# Patient Record
Sex: MALE | Race: WHITE | Employment: OTHER | URBAN - METROPOLITAN AREA
[De-identification: names, ages, dates, MRNs, and addresses within clinical notes are randomized per-mention and may not be internally consistent; named-entity substitution may affect disease eponyms.]

---

## 2021-12-13 ENCOUNTER — TRANSCRIBE ORDER (OUTPATIENT)
Dept: SCHEDULING | Age: 66
End: 2021-12-13

## 2021-12-13 DIAGNOSIS — R59.0 LOCALIZED ENLARGED LYMPH NODES: ICD-10-CM

## 2021-12-13 DIAGNOSIS — R94.4 ABNORMAL RESULTS OF KIDNEY FUNCTION STUDIES: ICD-10-CM

## 2021-12-13 DIAGNOSIS — R19.07 GENERALIZED INTRA-ABDOMINAL AND PELVIC SWELLING, MASS AND LUMP: Primary | ICD-10-CM

## 2021-12-13 DIAGNOSIS — L04.9 ACUTE LYMPHADENITIS: ICD-10-CM

## 2021-12-15 ENCOUNTER — HOSPITAL ENCOUNTER (OUTPATIENT)
Dept: CT IMAGING | Age: 66
Discharge: HOME | End: 2021-12-15
Payer: MEDICARE

## 2021-12-15 DIAGNOSIS — R19.07 GENERALIZED INTRA-ABDOMINAL AND PELVIC SWELLING, MASS AND LUMP: ICD-10-CM

## 2021-12-15 DIAGNOSIS — L04.9 ACUTE LYMPHADENITIS: ICD-10-CM

## 2021-12-15 DIAGNOSIS — R59.0 LOCALIZED ENLARGED LYMPH NODES: ICD-10-CM

## 2021-12-15 DIAGNOSIS — R94.4 ABNORMAL RESULTS OF KIDNEY FUNCTION STUDIES: ICD-10-CM

## 2021-12-15 PROCEDURE — 74177 CT ABD & PELVIS W/CONTRAST: CPT | Performed by: GENERAL PRACTICE

## 2021-12-21 ENCOUNTER — TRANSCRIBE ORDER (OUTPATIENT)
Dept: SCHEDULING | Age: 66
End: 2021-12-21

## 2021-12-21 DIAGNOSIS — R93.2 NONVISUALIZATION OF GALLBLADDER: Primary | ICD-10-CM

## 2021-12-23 ENCOUNTER — HOSPITAL ENCOUNTER (OUTPATIENT)
Dept: CT IMAGING | Age: 66
Discharge: HOME | End: 2021-12-23
Payer: MEDICARE

## 2021-12-23 DIAGNOSIS — R93.2 NONVISUALIZATION OF GALLBLADDER: ICD-10-CM

## 2021-12-23 PROCEDURE — 74178 CT ABD&PLV WO CNTR FLWD CNTR: CPT | Performed by: GENERAL PRACTICE

## 2022-01-04 ENCOUNTER — TRANSCRIBE ORDER (OUTPATIENT)
Dept: SCHEDULING | Age: 67
End: 2022-01-04

## 2022-01-04 DIAGNOSIS — R93.2 ABNORMAL CT OF LIVER: ICD-10-CM

## 2022-01-04 DIAGNOSIS — R19.7 DIARRHEA: Primary | ICD-10-CM

## 2022-01-04 DIAGNOSIS — R79.89 ABNORMAL LFTS: ICD-10-CM

## 2022-01-07 ENCOUNTER — HOSPITAL ENCOUNTER (OUTPATIENT)
Dept: MRI IMAGING | Age: 67
Discharge: HOME | End: 2022-01-07
Attending: GENERAL PRACTICE
Payer: MEDICARE

## 2022-01-07 VITALS — WEIGHT: 180 LBS

## 2022-01-07 DIAGNOSIS — R19.7 DIARRHEA: ICD-10-CM

## 2022-01-07 DIAGNOSIS — R93.2 ABNORMAL CT OF LIVER: ICD-10-CM

## 2022-01-07 DIAGNOSIS — R79.89 ABNORMAL LFTS: ICD-10-CM

## 2022-01-07 PROCEDURE — A9575 INJ GADOTERATE MEGLUMI 0.1ML: HCPCS | Performed by: RADIOLOGY

## 2022-01-07 PROCEDURE — 74183 MRI ABD W/O CNTR FLWD CNTR: CPT

## 2022-01-07 PROCEDURE — 74011250636 HC RX REV CODE- 250/636: Performed by: RADIOLOGY

## 2022-01-07 RX ORDER — GADOTERATE MEGLUMINE 376.9 MG/ML
16 INJECTION INTRAVENOUS
Status: COMPLETED | OUTPATIENT
Start: 2022-01-07 | End: 2022-01-07

## 2022-01-07 RX ADMIN — GADOTERATE MEGLUMINE 16 ML: 376.9 INJECTION INTRAVENOUS at 18:18

## 2023-01-09 ENCOUNTER — OFFICE VISIT (OUTPATIENT)
Dept: NEUROLOGY | Age: 68
End: 2023-01-09
Payer: MEDICARE

## 2023-01-09 VITALS
HEIGHT: 68 IN | HEART RATE: 101 BPM | WEIGHT: 191 LBS | SYSTOLIC BLOOD PRESSURE: 136 MMHG | OXYGEN SATURATION: 99 % | RESPIRATION RATE: 17 BRPM | BODY MASS INDEX: 28.95 KG/M2 | DIASTOLIC BLOOD PRESSURE: 74 MMHG

## 2023-01-09 DIAGNOSIS — Z79.899 LONG-TERM CURRENT USE OF ANTICONVULSANT: ICD-10-CM

## 2023-01-09 DIAGNOSIS — G40.309 GEN IDIOPATHIC EPILEPSY, NOT INTRACTABLE, W/O STAT EPI (HCC): Primary | ICD-10-CM

## 2023-01-09 PROCEDURE — 1101F PT FALLS ASSESS-DOCD LE1/YR: CPT | Performed by: PSYCHIATRY & NEUROLOGY

## 2023-01-09 PROCEDURE — G8427 DOCREV CUR MEDS BY ELIG CLIN: HCPCS | Performed by: PSYCHIATRY & NEUROLOGY

## 2023-01-09 PROCEDURE — G8417 CALC BMI ABV UP PARAM F/U: HCPCS | Performed by: PSYCHIATRY & NEUROLOGY

## 2023-01-09 PROCEDURE — 1123F ACP DISCUSS/DSCN MKR DOCD: CPT | Performed by: PSYCHIATRY & NEUROLOGY

## 2023-01-09 PROCEDURE — G8510 SCR DEP NEG, NO PLAN REQD: HCPCS | Performed by: PSYCHIATRY & NEUROLOGY

## 2023-01-09 PROCEDURE — 3017F COLORECTAL CA SCREEN DOC REV: CPT | Performed by: PSYCHIATRY & NEUROLOGY

## 2023-01-09 PROCEDURE — 99204 OFFICE O/P NEW MOD 45 MIN: CPT | Performed by: PSYCHIATRY & NEUROLOGY

## 2023-01-09 PROCEDURE — G8536 NO DOC ELDER MAL SCRN: HCPCS | Performed by: PSYCHIATRY & NEUROLOGY

## 2023-01-09 RX ORDER — CEFUROXIME AXETIL 250 MG/1
TABLET ORAL
COMMUNITY
Start: 2022-11-23

## 2023-01-09 RX ORDER — ATORVASTATIN CALCIUM 40 MG/1
TABLET, FILM COATED ORAL
COMMUNITY
Start: 2022-11-15

## 2023-01-09 RX ORDER — DIVALPROEX SODIUM 250 MG/1
TABLET, DELAYED RELEASE ORAL
Qty: 270 TABLET | Refills: 3 | Status: SHIPPED | OUTPATIENT
Start: 2023-01-09

## 2023-01-09 RX ORDER — TADALAFIL 5 MG/1
TABLET ORAL
COMMUNITY

## 2023-01-09 RX ORDER — GLUCOSAMINE SULFATE 1500 MG
POWDER IN PACKET (EA) ORAL
COMMUNITY

## 2023-01-09 RX ORDER — SILDENAFIL 25 MG/1
25 TABLET, FILM COATED ORAL AS NEEDED
COMMUNITY

## 2023-01-09 RX ORDER — DIVALPROEX SODIUM 250 MG/1
250 TABLET, DELAYED RELEASE ORAL 3 TIMES DAILY
COMMUNITY

## 2023-01-09 NOTE — PROGRESS NOTES
Chief Complaint   Patient presents with    New Patient    Epilepsy       Referred by: DR Linda Figueroa is a 69-year-old gentleman with epilepsy here to establish care. I spoke to him personally. He tells me he was diagnosed with epilepsy at about age 23. His last seizure was 0. He is well controlled currently on Depakote 250 mg in the morning and 500 mg at night. He had been on Dilantin previously but had a lot of side effects that was discontinued. He is a retired . Review of Systems   Neurological:  Negative for seizures. History reviewed. No pertinent past medical history. History reviewed. No pertinent family history. Social History     Socioeconomic History    Marital status:      Spouse name: Not on file    Number of children: Not on file    Years of education: Not on file    Highest education level: Not on file   Occupational History    Not on file   Tobacco Use    Smoking status: Never    Smokeless tobacco: Never   Vaping Use    Vaping Use: Never used   Substance and Sexual Activity    Alcohol use: Not on file    Drug use: Never    Sexual activity: Not on file   Other Topics Concern    Not on file   Social History Narrative    Not on file     Social Determinants of Health     Financial Resource Strain: Not on file   Food Insecurity: Not on file   Transportation Needs: Not on file   Physical Activity: Not on file   Stress: Not on file   Social Connections: Not on file   Intimate Partner Violence: Not on file   Housing Stability: Not on file     Current Outpatient Medications   Medication Sig    atorvastatin (LIPITOR) 40 mg tablet     divalproex DR (DEPAKOTE) 250 mg tablet Take 250 mg by mouth three (3) times daily. sildenafil citrate (Viagra) 25 mg tablet Take 25 mg by mouth as needed for Erectile Dysfunction.     divalproex DR (DEPAKOTE) 250 mg tablet Take 1 tab in the AM and take 2 tabs in PM    cefUROXime (CEFTIN) 250 mg tablet  (Patient not taking: Reported on 1/9/2023)    cholecalciferol (VITAMIN D3) 25 mcg (1,000 unit) cap Vitamin D3 25 mcg (1,000 unit) capsule   Take 1 capsule every day by oral route. (Patient not taking: Reported on 1/9/2023)    tadalafiL (CIALIS) 5 mg tablet tadalafil 5 mg tablet   Take 1 tablet every day by oral route. (Patient not taking: Reported on 1/9/2023)     No current facility-administered medications for this visit. Not on File      Neurologic Exam     Mental Status   Oriented to person, place, and time. Cranial Nerves   Cranial nerves II through XII intact. Face is covered     Motor Exam No drift     Gait, Coordination, and Reflexes     Gait  Gait: normal  Physical Exam  Vitals and nursing note reviewed. Constitutional:       Appearance: Normal appearance. He is normal weight. Neurological:      Mental Status: He is alert and oriented to person, place, and time. Cranial Nerves: Cranial nerves 2-12 are intact. Gait: Gait is intact. Psychiatric:         Mood and Affect: Mood normal.         Behavior: Behavior normal.     Visit Vitals  /74 (BP 1 Location: Left arm, BP Patient Position: Sitting, BP Cuff Size: Adult)   Pulse (!) 101   Resp 17   Ht 5' 8\" (1.727 m)   Wt 191 lb (86.6 kg)   SpO2 99%   BMI 29.04 kg/m²       No results found for: WBC, WBCT, WBCPOC, HGB, HGBPOC, HCT, HCTPOC, PLT, PLTPOC, MCV, MCVPOC, HGBEXT, HCTEXT, PLTEXT  No results found for: ALTPOC, ALT, ASTPOC, GGT, AP, APIT, APX, CBIL, TBIL, TBILI, ALB, ALBPOC, TP, NH3, NH4, INR, INREXT, PTP, PTINR, PTEXT, PLT, PLTPOC, HCABQL, HBSAG, AFP, INREXT, PTEXT, PLTEXT     CT Results (maximum last 3): Results from [place] encounter on 12/23/21    CT ABD PELV W WO CONT    Narrative  EXAM: CT ABD PELV W WO CONT    INDICATION: Not visualized in the gallbladder    COMPARISON: 12/15/2021. CONTRAST: 100 mL of Isovue-370.     TECHNIQUE:  Multislice helical CT was performed from the diaphragm to the iliac crest prior  to intravenous contrast administration and from the diaphragm to the symphysis  pubis during uneventful rapid bolus intravenous contrast administration. Oral  contrast administered. Contiguous 5 mm axial images were reconstructed and lung  and soft tissue windows were generated. Coronal and sagittal reformations were  generated. CT dose reduction was achieved through use of a standardized  protocol tailored for this examination and automatic exposure control for dose  modulation. FINDINGS:  LOWER THORAX: No significant abnormality in the incidentally imaged lower chest.  LIVER: Multiple small lesions are noted. The dome the liver series 4 image 19, there are 2 small hypervascular foci in  the subcapsular region which are not visualized on the delayed images. Posteriorly in the right hepatic lobe series 4 image 30, there is a  hypervascular lesion which demonstrates a central hypodensity on early arterial  phase and eventually fills in on delayed images. A similar lesion is noted  posteriorly in the right hepatic lobe series 4 image 40. There is a small  hypervascular lesion in the left hepatic lobe series 4 image 27 which  demonstrates arterial enhancement and is not visualized on the delayed images. BILIARY TREE: Gallbladder is within normal limits. CBD is not dilated. SPLEEN: within normal limits. PANCREAS: No mass or ductal dilatation. ADRENALS: Unremarkable. KIDNEYS: No mass, calculus, or hydronephrosis. STOMACH: Unremarkable. SMALL BOWEL: No dilatation or wall thickening. COLON: Diverticulosis. APPENDIX: Not visualized  PERITONEUM: No ascites or pneumoperitoneum. RETROPERITONEUM: Atherosclerotic disease. No evidence of aneurysm  REPRODUCTIVE ORGANS: Unremarkable  URINARY BLADDER: No mass or calculus. BONES: No destructive bone lesion. ABDOMINAL WALL: No mass or hernia. ADDITIONAL COMMENTS: N/A    Impression  1. Multiple liver lesions as described.  The 2 smaller lesions in the dome the  liver in the subcapsular region demonstrate wedge-shaped morphology and likely  represent small perfusion abnormalities. The other lesions may represent  hemangiomas although demonstrates somewhat atypical characteristics. MRI is  recommended for further assessment. 2.  Other incidental findings as above      Results from Hospital Encounter encounter on 12/15/21    CT ABD PELV W CONT    Narrative  CT ABDOMEN AND PELVIS WITH CONTRAST. 12/15/2021 2:35 PM    INDICATION: Generalized intra-abdominal and pelvic swelling, mass and lump. Enlarged lymph nodes. COMPARISON: None available. TECHNIQUE: CT of the abdomen and pelvis was performed after the administration  100 cc IV Isovue-370 and oral contrast. CT dose reduction was achieved through  use of a standardized protocol tailored for this examination and automatic  exposure control for dose modulation. FINDINGS:  Inferior chest: A nodule in the left costophrenic angle shows central coarse  calcification (3-31), consistent with a granuloma. A calcified paraceliac lymph  node is further evidence of old granulomatous disease. There is minimal  dependent atelectasis in the lung bases. The heart size is normal.    Abdomen: A subcapsular hypodense hepatic mass in segment 7 measures 16 x 16 x 8  mm (2-30, 8049-61) and is indeterminate. A similar, smaller, subcapsular lesion,  more superiorly in segment 7, measures only 6 mm (8049-58, 2-25). Hepatic  density is borderline for steatosis. The spleen is at the upper limits of normal  in size. The distal esophagus, stomach, duodenum, gallbladder, pancreas,  adrenals, and kidneys are normal.    Pelvis: Sigmoid diverticulosis is mild. The small bowel, ileocecal junction,  colon, and bladder are otherwise normal. The appendix is not visualized; no  pericecal inflammatory process. No free air or fluid, and no abdominopelvic  lymphadenopathy. Impression  1. Small, indeterminate, hypodense hepatic lesions.   2. If outside cross-sectional studies of the abdomen are available, a comparison  addendum can be made to this report when the images are obtained and uploaded in  PACS. 3. Otherwise, liver protocol CT is recommended for further evaluation. Liver MRI  is not recommended, due to the small size and subdiaphragmatic location of these  lesions. They will likely be obscured on MRI. MRI Results (maximum last 3): Results from [facility] encounter on 01/07/22    MRI ABD W WO CONT    Narrative  Procedure: MRI of the abdomen with MRCP    DATE: 1/7/2022 6:31 PM    TECHNIQUE: Multiplanar MRI of the abdomen was performed with and without  contrast including T2-weighted fat-sat and nonfat sat images, axial in phase and  out of phase imaging, and multiphase postcontrast imaging as well as 3-D MRCP. Contrast: 16 mL Dotarem    COMPARISON: 12/23/2021    INDICATION: Liver lesions    FINDINGS:    Liver: Small foci of peripheral enhancement in the dome the liver posteriorly  series 701 image 31 are barely visualized. There is no associated T2 abnormality  with these lesions. On series 701 image 37, there is an area of arterial  enhancement with central hypoenhancement which fills in on delayed images  demonstrates a small focus of T2 hyperintensity series 6 image 25 most  consistent with a hemangioma. More inferiorly in the posterior right hepatic  lobe posteriorly series 6 image 22 and series 701 image 53 there is a similar  lesion which demonstrates central hypoenhancement with delayed central  enhancement and T2 hyperintensity. A fourth lesion noted in the caudate  demonstrates similar enhancement characteristics as well consistent with a  hemangioma series 701 image 55. T2 hyperintense lesion series 6 image 25 and  segment 2 of the liver which demonstrates possible arterial enhancement and  persistent enhancement on delayed images.     Gallbladder: Unremarkable    Biliary tree: Unremarkable    Spleen: Unremarkable    Pancreas: Unremarkable    Adrenals: Unremarkable    Kidneys: Unremarkable    Vascular: Unremarkable    Soft tissues and osseous structures: Unremarkable    Impression  Findings are similar to the prior CT. 4 lesions which are most consistent with  hemangiomas. The 2 smaller areas of arterial enhancement at the dome of the  liver are not as well-visualized but likely small areas of altered perfusion. PET Results (maximum last 3): No results found for this or any previous visit. Assessment and Plan   Diagnoses and all orders for this visit:    1. 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 Long-term current use of anticonvulsant  -     VALPROIC ACID; Future  -     METABOLIC PANEL, COMPREHENSIVE; Future  -     divalproex DR (DEPAKOTE) 250 mg tablet; Take 1 tab in the AM and take 2 tabs in PM    72-year-old gentleman with epilepsy currently well controlled. Going to maintain his current dosing of Depakote. We need to check LFTs today and a basic Depakote level. He has been very stable since 1998 and I will not be making any med changes today. He understands. Have no restrictions on his driving or working. He is going to follow-up with the nurse practitioner at the end of this year. Questions answered. I reviewed and decided to continue the current medications. This clinical note was dictated with an electronic dictation software that can make unintentional errors. If there are any questions, please contact me directly for clarification. A notice of this visit/encounter being completed has been sent electronically to the patient's PCP and/or referring provider.      2 [facility], [address]  Diplomate LIDIAN

## 2023-01-09 NOTE — PROGRESS NOTES
Chief Complaint   Patient presents with    New Patient    Epilepsy   Visit Vitals  /74 (BP 1 Location: Left arm, BP Patient Position: Sitting, BP Cuff Size: Adult)   Pulse (!) 101   Resp 17   Ht 5' 8\" (1.727 m)   Wt 191 lb (86.6 kg)   SpO2 99%   BMI 29.04 kg/m²

## 2023-01-10 LAB
ALBUMIN SERPL-MCNC: 4.7 G/DL (ref 3.8–4.8)
ALBUMIN/GLOB SERPL: 1.8 {RATIO} (ref 1.2–2.2)
ALP SERPL-CCNC: 77 IU/L (ref 44–121)
ALT SERPL-CCNC: 50 IU/L (ref 0–44)
AST SERPL-CCNC: 46 IU/L (ref 0–40)
BILIRUB SERPL-MCNC: 0.4 MG/DL (ref 0–1.2)
BUN SERPL-MCNC: 9 MG/DL (ref 8–27)
BUN/CREAT SERPL: 8 (ref 10–24)
CALCIUM SERPL-MCNC: 9.6 MG/DL (ref 8.6–10.2)
CHLORIDE SERPL-SCNC: 99 MMOL/L (ref 96–106)
CO2 SERPL-SCNC: 24 MMOL/L (ref 20–29)
CREAT SERPL-MCNC: 1.15 MG/DL (ref 0.76–1.27)
EGFR: 70 ML/MIN/1.73
GLOBULIN SER CALC-MCNC: 2.6 G/DL (ref 1.5–4.5)
GLUCOSE SERPL-MCNC: 96 MG/DL (ref 70–99)
POTASSIUM SERPL-SCNC: 4.5 MMOL/L (ref 3.5–5.2)
PROT SERPL-MCNC: 7.3 G/DL (ref 6–8.5)
SODIUM SERPL-SCNC: 136 MMOL/L (ref 134–144)
VALPROATE SERPL-MCNC: 94 UG/ML (ref 50–100)

## 2023-01-10 NOTE — PROGRESS NOTES
Depakote level is at the upper limits of normal.  LFTs slightly bumped but not excessively. Will defer to primary care following the LFTs. No change to Depakote.

## 2023-05-16 RX ORDER — DIVALPROEX SODIUM 250 MG/1
TABLET, DELAYED RELEASE ORAL
COMMUNITY
Start: 2023-01-09

## 2023-05-16 RX ORDER — TADALAFIL 5 MG/1
TABLET ORAL
COMMUNITY

## 2023-05-16 RX ORDER — ATORVASTATIN CALCIUM 40 MG/1
TABLET, FILM COATED ORAL
COMMUNITY
Start: 2022-11-15

## 2023-05-16 RX ORDER — CEFUROXIME AXETIL 250 MG/1
TABLET ORAL
COMMUNITY
Start: 2022-11-23

## 2023-05-16 RX ORDER — SILDENAFIL 25 MG/1
25 TABLET, FILM COATED ORAL PRN
COMMUNITY

## 2023-10-09 ENCOUNTER — OFFICE VISIT (OUTPATIENT)
Age: 68
End: 2023-10-09
Payer: MEDICARE

## 2023-10-09 VITALS
SYSTOLIC BLOOD PRESSURE: 140 MMHG | BODY MASS INDEX: 28.19 KG/M2 | DIASTOLIC BLOOD PRESSURE: 88 MMHG | OXYGEN SATURATION: 99 % | WEIGHT: 186 LBS | HEIGHT: 68 IN | HEART RATE: 84 BPM

## 2023-10-09 DIAGNOSIS — Z79.899 OTHER LONG TERM (CURRENT) DRUG THERAPY: ICD-10-CM

## 2023-10-09 DIAGNOSIS — G40.309 GENERALIZED IDIOPATHIC EPILEPSY AND EPILEPTIC SYNDROMES, NOT INTRACTABLE, WITHOUT STATUS EPILEPTICUS (HCC): Primary | ICD-10-CM

## 2023-10-09 PROCEDURE — 99214 OFFICE O/P EST MOD 30 MIN: CPT

## 2023-10-09 PROCEDURE — 1123F ACP DISCUSS/DSCN MKR DOCD: CPT

## 2023-10-09 RX ORDER — DIVALPROEX SODIUM 250 MG/1
TABLET, DELAYED RELEASE ORAL
Qty: 270 TABLET | Refills: 2 | Status: SHIPPED | OUTPATIENT
Start: 2023-10-09

## 2023-10-09 NOTE — PROGRESS NOTES
Chief Complaint   Patient presents with    Follow-up     Epilepsy      Vitals:    10/09/23 0935   BP: (!) 140/88   Pulse: 84   SpO2: 99%
fat-sat and nonfat sat images, axial in phase and  out of phase imaging, and multiphase postcontrast imaging as well as 3-D MRCP. Contrast: 16 mL Dotarem    COMPARISON: 12/23/2021    INDICATION: Liver lesions    FINDINGS:    Liver: Small foci of peripheral enhancement in the dome the liver posteriorly  series 701 image 31 are barely visualized. There is no associated T2 abnormality  with these lesions. On series 701 image 37, there is an area of arterial  enhancement with central hypoenhancement which fills in on delayed images  demonstrates a small focus of T2 hyperintensity series 6 image 25 most  consistent with a hemangioma. More inferiorly in the posterior right hepatic  lobe posteriorly series 6 image 22 and series 701 image 53 there is a similar  lesion which demonstrates central hypoenhancement with delayed central  enhancement and T2 hyperintensity. A fourth lesion noted in the caudate  demonstrates similar enhancement characteristics as well consistent with a  hemangioma series 701 image 55. T2 hyperintense lesion series 6 image 25 and  segment 2 of the liver which demonstrates possible arterial enhancement and  persistent enhancement on delayed images. Gallbladder: Unremarkable    Biliary tree: Unremarkable    Spleen: Unremarkable    Pancreas: Unremarkable    Adrenals: Unremarkable    Kidneys: Unremarkable    Vascular: Unremarkable    Soft tissues and osseous structures: Unremarkable    Impression  Findings are similar to the prior CT. 4 lesions which are most consistent with  hemangiomas. The 2 smaller areas of arterial enhancement at the dome of the  liver are not as well-visualized but likely small areas of altered perfusion. Return in about 1 year (around 10/9/2024). Assessment and Plan   Dustin Baird was seen today for follow-up.     Diagnoses and all orders for this visit:    Generalized idiopathic epilepsy and epileptic syndromes, not intractable, without status epilepticus ([address])  -

## 2023-10-10 ENCOUNTER — TELEPHONE (OUTPATIENT)
Age: 68
End: 2023-10-10

## 2023-10-10 LAB
ALBUMIN SERPL-MCNC: 4.8 G/DL (ref 3.9–4.9)
ALBUMIN/GLOB SERPL: 1.7 {RATIO} (ref 1.2–2.2)
ALP SERPL-CCNC: 74 IU/L (ref 44–121)
ALT SERPL-CCNC: 34 IU/L (ref 0–44)
AST SERPL-CCNC: 32 IU/L (ref 0–40)
BILIRUB SERPL-MCNC: 0.4 MG/DL (ref 0–1.2)
BUN SERPL-MCNC: 9 MG/DL (ref 8–27)
BUN/CREAT SERPL: 7 (ref 10–24)
CALCIUM SERPL-MCNC: 9.5 MG/DL (ref 8.6–10.2)
CHLORIDE SERPL-SCNC: 100 MMOL/L (ref 96–106)
CO2 SERPL-SCNC: 21 MMOL/L (ref 20–29)
CREAT SERPL-MCNC: 1.28 MG/DL (ref 0.76–1.27)
EGFRCR SERPLBLD CKD-EPI 2021: 61 ML/MIN/1.73
GLOBULIN SER CALC-MCNC: 2.8 G/DL (ref 1.5–4.5)
GLUCOSE SERPL-MCNC: 92 MG/DL (ref 70–99)
POTASSIUM SERPL-SCNC: 4.8 MMOL/L (ref 3.5–5.2)
PROT SERPL-MCNC: 7.6 G/DL (ref 6–8.5)
SODIUM SERPL-SCNC: 141 MMOL/L (ref 134–144)
VALPROATE SERPL-MCNC: 87 UG/ML (ref 50–100)

## 2023-10-10 NOTE — TELEPHONE ENCOUNTER
Called patient. No answer. Left a VM stating the provider reviewed his lab results, \"Labs look great today. Liver levels are normal, along with drug level. Creatine is slightly elevated, drink lots of water and FU with PCP. \"

## 2023-10-10 NOTE — RESULT ENCOUNTER NOTE
Labs look great today. Liver levels are normal, along with drug level. Creatine is slightly elevated, drink lots of water and FU with PCP.

## 2024-08-06 DIAGNOSIS — G40.309 GENERALIZED IDIOPATHIC EPILEPSY AND EPILEPTIC SYNDROMES, NOT INTRACTABLE, WITHOUT STATUS EPILEPTICUS (HCC): ICD-10-CM

## 2024-08-06 DIAGNOSIS — Z79.899 OTHER LONG TERM (CURRENT) DRUG THERAPY: ICD-10-CM

## 2024-08-06 RX ORDER — DIVALPROEX SODIUM 250 MG/1
TABLET, DELAYED RELEASE ORAL
Qty: 270 TABLET | Refills: 2 | Status: SHIPPED | OUTPATIENT
Start: 2024-08-06

## 2024-10-14 NOTE — PROGRESS NOTES
Chief Complaint   Patient presents with    Follow-up    Seizures       HPI    Mr Alexander is a 69 year old male here for FU. I saw him last on 10/9/23 for epilepsy, Last seizure was in 1998.  He takes Depakote 250 mg AM and 500 mg PM. Last set of lab work looked well. He is reporting today that he continues to do well. No seizure like activity or side effects from the medication. He feels great, no new complaints. Reports his PCP is watching his insulin blood levels on his labs. A1c and BG are all normal.               Review of Systems   Neurological:  Negative for seizures.         History reviewed. No pertinent past medical history.  History reviewed. No pertinent family history.  Social History     Socioeconomic History    Marital status:      Spouse name: Not on file    Number of children: Not on file    Years of education: Not on file    Highest education level: Not on file   Occupational History    Not on file   Tobacco Use    Smoking status: Never    Smokeless tobacco: Never   Vaping Use    Vaping status: Never Used    Passive vaping exposure: Yes   Substance and Sexual Activity    Alcohol use: Not on file    Drug use: Never    Sexual activity: Not on file   Other Topics Concern    Not on file   Social History Narrative    Not on file     Social Determinants of Health     Financial Resource Strain: Not on file   Food Insecurity: Not on file   Transportation Needs: Not on file   Physical Activity: Not on file   Stress: Not on file   Social Connections: Not on file   Intimate Partner Violence: Not on file   Housing Stability: Not on file     No Known Allergies      Current Outpatient Medications   Medication Sig    divalproex (DEPAKOTE) 250 MG DR tablet TAKE ONE TABLET BY MOUTH EVERY MORNING AND TAKE TWO TABLETS BY MOUTH EVERY EVENING    atorvastatin (LIPITOR) 40 MG tablet ceived the following from [facility]: Outside name: atorvastatin (LIPITOR) 40 mg tablet     No current

## 2024-10-15 ENCOUNTER — OFFICE VISIT (OUTPATIENT)
Age: 69
End: 2024-10-15
Payer: MEDICARE

## 2024-10-15 VITALS
DIASTOLIC BLOOD PRESSURE: 84 MMHG | HEIGHT: 68 IN | HEART RATE: 83 BPM | OXYGEN SATURATION: 99 % | WEIGHT: 186 LBS | SYSTOLIC BLOOD PRESSURE: 136 MMHG | BODY MASS INDEX: 28.19 KG/M2 | RESPIRATION RATE: 17 BRPM

## 2024-10-15 DIAGNOSIS — G40.309 GENERALIZED IDIOPATHIC EPILEPSY AND EPILEPTIC SYNDROMES, NOT INTRACTABLE, WITHOUT STATUS EPILEPTICUS (HCC): Primary | ICD-10-CM

## 2024-10-15 DIAGNOSIS — Z79.899 OTHER LONG TERM (CURRENT) DRUG THERAPY: ICD-10-CM

## 2024-10-15 PROCEDURE — G8428 CUR MEDS NOT DOCUMENT: HCPCS

## 2024-10-15 PROCEDURE — 1123F ACP DISCUSS/DSCN MKR DOCD: CPT

## 2024-10-15 PROCEDURE — 3017F COLORECTAL CA SCREEN DOC REV: CPT

## 2024-10-15 PROCEDURE — G8484 FLU IMMUNIZE NO ADMIN: HCPCS

## 2024-10-15 PROCEDURE — 99214 OFFICE O/P EST MOD 30 MIN: CPT

## 2024-10-15 PROCEDURE — 1036F TOBACCO NON-USER: CPT

## 2024-10-15 PROCEDURE — G8419 CALC BMI OUT NRM PARAM NOF/U: HCPCS

## 2024-10-15 ASSESSMENT — PATIENT HEALTH QUESTIONNAIRE - PHQ9
SUM OF ALL RESPONSES TO PHQ QUESTIONS 1-9: 0
SUM OF ALL RESPONSES TO PHQ QUESTIONS 1-9: 0
2. FEELING DOWN, DEPRESSED OR HOPELESS: NOT AT ALL
1. LITTLE INTEREST OR PLEASURE IN DOING THINGS: NOT AT ALL
SUM OF ALL RESPONSES TO PHQ QUESTIONS 1-9: 0
SUM OF ALL RESPONSES TO PHQ QUESTIONS 1-9: 0
SUM OF ALL RESPONSES TO PHQ9 QUESTIONS 1 & 2: 0

## 2024-10-15 NOTE — PROGRESS NOTES
Chief Complaint   Patient presents with    Follow-up    Seizures      Vitals:    10/15/24 1342   BP: 136/84   Pulse: 83   Resp: 17   SpO2: 99%

## 2024-10-22 ENCOUNTER — PATIENT MESSAGE (OUTPATIENT)
Age: 69
End: 2024-10-22

## 2024-10-22 ENCOUNTER — TELEPHONE (OUTPATIENT)
Age: 69
End: 2024-10-22

## 2024-10-22 DIAGNOSIS — Z79.899 OTHER LONG TERM (CURRENT) DRUG THERAPY: ICD-10-CM

## 2024-10-22 DIAGNOSIS — G40.309 GENERALIZED IDIOPATHIC EPILEPSY AND EPILEPTIC SYNDROMES, NOT INTRACTABLE, WITHOUT STATUS EPILEPTICUS (HCC): Primary | ICD-10-CM

## 2024-10-22 LAB — VALPROATE SERPL-MCNC: 111 UG/ML (ref 50–100)

## 2024-10-22 RX ORDER — DIVALPROEX SODIUM 125 MG/1
TABLET, DELAYED RELEASE ORAL
Qty: 90 TABLET | Refills: 1 | Status: SHIPPED | OUTPATIENT
Start: 2024-10-22

## 2024-10-22 NOTE — TELEPHONE ENCOUNTER
Yes lets do 250 mg in the morning and 375 mg at night x 1 month and recheck labs. I will place all those orders right now.

## 2024-10-22 NOTE — TELEPHONE ENCOUNTER
Call placed to patient.  2 identifiers received.  Advised per NP Carter to start depakote 250 in the morning and 375mg at night.  Recheck labs in 1 month.  Patient indicated understanding.

## 2024-10-23 ENCOUNTER — TELEPHONE (OUTPATIENT)
Age: 69
End: 2024-10-23

## 2024-11-22 ENCOUNTER — TELEPHONE (OUTPATIENT)
Age: 69
End: 2024-11-22

## 2024-11-22 LAB — VALPROATE SERPL-MCNC: 79 UG/ML (ref 50–100)

## 2024-11-22 NOTE — TELEPHONE ENCOUNTER
Call placed to patient.  LVM on identified line advising that Valporic acid lab results were within normal limits.

## 2025-03-11 DIAGNOSIS — Z79.899 OTHER LONG TERM (CURRENT) DRUG THERAPY: ICD-10-CM

## 2025-03-11 DIAGNOSIS — G40.309 GENERALIZED IDIOPATHIC EPILEPSY AND EPILEPTIC SYNDROMES, NOT INTRACTABLE, WITHOUT STATUS EPILEPTICUS (HCC): ICD-10-CM

## 2025-03-11 RX ORDER — DIVALPROEX SODIUM 125 MG/1
TABLET, DELAYED RELEASE ORAL
Qty: 90 TABLET | Refills: 1 | Status: SHIPPED | OUTPATIENT
Start: 2025-03-11